# Patient Record
(demographics unavailable — no encounter records)

---

## 2017-08-27 NOTE — CARD
--------------- APPROVED REPORT --------------





EKG Measurement

Heart [id number]

[id number]

[id number]

[id number]

[id number]



<Conclusion>

Sinus tachycardia

Nonspecific ST abnormality

Abnormal ECG

## 2019-08-26 NOTE — ED PDOC
Arrival/HPI





- General


Chief Complaint: Chest Pain


Time Seen by Provider: 08/26/17 13:32


Historian: Patient,  ()





- History of Present Illness


Time/Duration: Other (3 days)


Symptom Onset: Gradual


Symptom Course: Unchanged


Severity Level: Moderate


Activities at Onset: Rest


Associated Symptoms (Text): 





08/26/17 13:50


Patient, through the , reports a three-day history of left-sided 

chest pain with radiates to her back and left arm and is made worse by deep 

breathing movement and coughing. No injury or trauma. She also has dyspnea. 

There is some dizziness. Some calf pain, but no swelling. Some nausea but no 

vomiting. No abdominal pain. No fever or chills. No URI. She has never 

experienced this previously.





Past Medical History





- Infectious Disease


Hx of Infectious Diseases: None





- Cardiac


Other/Comment: High Triglycerides





- Psychiatric


Hx Substance Use: No





Family/Social History





- Physician Review


Nursing Documentation Reviewed: Yes


Family/Social History: Unknown Family HX


Smoking Status: Never Smoked


Hx Alcohol Use: No


Hx Substance Use: No





Allergies/Home Meds


Allergies/Adverse Reactions: 


Allergies





No Known Allergies Allergy (Verified 08/26/17 13:42)


 











Review of Systems





- Physician Review


All systems were reviewed & negative as marked: Yes





- Review of Systems


Constitutional: absent: Fatigue, Fevers


Respiratory: SOB.  absent: Cough, Wheezing


Cardiovascular: Chest Pain.  absent: Palpitations, Orthopnea, Syncope


Gastrointestinal: Nausea.  absent: Abdominal Pain, Constipation, Diarrhea, 

Vomiting


Genitourinary Female: absent: Dysuria, Frequency, Hematuria


Neurological: Dizziness.  absent: Headache, Focal Weakness, Gait Changes, 

Speech Changes, Facial Droop, Disequilibrium, Seizure





Physical Exam


Vital Signs











  Temp Pulse Resp BP Pulse Ox


 


 08/26/17 14:03  97.8 F  101 H  19  138/88  100


 


 08/26/17 13:33  98.1 F  103 H  18  138/84  100











Temperature: Afebrile


Blood Pressure: Normal


Pulse: Regular


Respiratory Rate: Normal


Appearance: Positive for: Well-Appearing, Non-Toxic, Uncomfortable


Pain Distress: Mild


Mental Status: Positive for: other (Awake alert and cooperative)





- Systems Exam


Head: Present: Atraumatic, Normocephalic


Pupils: Present: PERRL


Extroacular Muscles: Present: EOMI


Conjunctiva: Present: Normal


Ears: Present: NORMAL TM, Normal Canal.  No: Erythema


Mouth: Present: Moist Mucous Membranes


Pharnyx: No: ERYTHEMA, EXUDATE, TONSILS ENLARGED


Neck: Present: Normal Range of Motion


Respiratory/Chest: Present: Clear to Auscultation, Good Air Exchange.  No: 

Respiratory Distress, Accessory Muscle Use, Wheezes, Decreased Breath Sounds, 

Rales, Retracting, Rhonchi, Tachypneic, Tender to Palpation


Cardiovascular: Present: Regular Rate and Rhythm, Normal S1, S2.  No: Murmurs


Abdomen: Present: Normal Bowel Sounds.  No: Tenderness, Distention, Peritoneal 

Signs, Rebound, Guarding


Back: Present: Normal Inspection.  No: CVA Tenderness, Midline Tenderness, 

Paraspinal Tenderness


Upper Extremity: Present: Normal Inspection.  No: Cyanosis, Edema


Lower Extremity: Present: Normal Inspection, NORMAL PULSES.  No: Edema, CALF 

TENDERNESS, Cyanosis, Normal ROM, Lynette's Sign, Tenderness, Swelling, Erythema, 

Deformity, Neurovascularly Intact


Neurological: Present: GCS=15, CN II-XII Intact, Speech Normal, Motor Func 

Grossly Intact


Skin: Present: Warm, Dry, Normal Color.  No: Rashes


Psychiatric: Present: Alert, Oriented x 3, Normal Insight, Normal Concentration





Medical Decision Making


ED Course and Treatment: 





08/26/17 14:07


EKG shows sinus tachycardia rate approximately 105 with no acute ST or T-wave 

changes


08/26/17 15:27


Symptoms markedly improved. Patient will be discharged home accompanied by her 

 to follow up in the clinic. Workup is unrevealing. Treated for pleurisy 

with Naprosyn.  Follow up in ER as needed.





- Lab Interpretations


Lab Results: 








 08/26/17 14:05 





 08/26/17 14:05 





 Lab Results





08/26/17 14:05: Sodium 140, Potassium 3.9, Chloride 101, Carbon Dioxide 26, 

Anion Gap 17, BUN 14, Creatinine 0.6, Est GFR (African Amer) > 60, Est GFR (Non-

Af Amer) > 60, Random Glucose 132 H, Calcium 9.2, Total Bilirubin 0.3, AST 31, 

ALT 36, Alkaline Phosphatase 91, Lactate Dehydrogenase 404, Total Creatine 

Kinase 44, Troponin I < 0.01, NT-Pro-B Natriuret Pep 80.4, Total Protein 8.0, 

Albumin 4.4, Globulin 3.5, Albumin/Globulin Ratio 1.3


08/26/17 14:05: Urine Color Yellow, Urine Appearance Clear, Urine pH 6.0, Ur 

Specific Gravity 1.010, Urine Protein Negative, Urine Glucose (UA) Negative, 

Urine Ketones Negative, Urine Blood Trace-lysed H, Urine Nitrate Negative, 

Urine Bilirubin Negative, Urine Urobilinogen 0.2, Ur Leukocyte Esterase Small H

, Urine RBC 0 - 2, Urine WBC 5 - 10, Ur Epithelial Cells 4 - 5, Urine Bacteria 

Small


08/26/17 14:05: PT 10.5, INR 0.97, APTT 28.0, D-Dimer, Quantitative 0.24


08/26/17 14:05: WBC 9.1, RBC 5.29, Hgb 14.7, Hct 44.1, MCV 83.4, MCH 27.8, MCHC 

33.3, RDW 13.4, Plt Count 333, MPV 10.0, Gran % 63.6, Lymph % (Auto) 24.9, Mono 

% (Auto) 10.9 H, Eos % (Auto) 0.4 L, Baso % (Auto) 0.2, Gran # 5.79, Lymph # 2.3

, Mono # 1.0 H, Eos # 0.0, Baso # 0.02











- RAD Interpretation


Radiology Orders: 








08/26/17 13:49


CHEST PORTABLE [RAD] Stat 








Chest 1 view shows no infiltrate or effusion or cardiomegaly


: ED Physician





- Medication Orders


Current Medication Orders: 











Discontinued Medications





Ketorolac Tromethamine (Toradol)  30 mg IVP ONCE ONE


   Stop: 08/26/17 13:51


   Last Admin: 08/26/17 14:06  Dose: 30 mg











Disposition/Present on Arrival





- Present on Arrival


Any Indicators Present on Arrival: No


History of DVT/PE: No


History of Uncontrolled Diabetes: No


Urinary Catheter: No


History of Decub. Ulcer: No


History Surgical Site Infection Following: None





- Disposition


Have Diagnosis and Disposition been Completed?: Yes


Diagnosis: 


 Pleurisy





Disposition: HOME/ ROUTINE


Disposition Time: 15:27


Patient Plan: Discharge


Condition: IMPROVED


Discharge Instructions (ExitCare):  Pleurisy (ED)


Prescriptions: 


Naproxen [Naprosyn] 500 mg PO BID #14 tab


Referrals: 


PCP,NO [Primary Care Provider] - Follow up with primary


[facility] [place] [Outside] - Follow up with primary


Forms:  [employer] (English) Alert and oriented, no focal deficits, no motor or sensory deficits.